# Patient Record
Sex: FEMALE | Race: ASIAN
[De-identification: names, ages, dates, MRNs, and addresses within clinical notes are randomized per-mention and may not be internally consistent; named-entity substitution may affect disease eponyms.]

---

## 2021-11-01 ENCOUNTER — APPOINTMENT (OUTPATIENT)
Dept: HEART AND VASCULAR | Facility: CLINIC | Age: 66
End: 2021-11-01
Payer: COMMERCIAL

## 2021-11-01 ENCOUNTER — NON-APPOINTMENT (OUTPATIENT)
Age: 66
End: 2021-11-01

## 2021-11-01 VITALS
DIASTOLIC BLOOD PRESSURE: 80 MMHG | SYSTOLIC BLOOD PRESSURE: 140 MMHG | RESPIRATION RATE: 12 BRPM | HEART RATE: 95 BPM | WEIGHT: 138 LBS | BODY MASS INDEX: 25.4 KG/M2 | HEIGHT: 62 IN

## 2021-11-01 DIAGNOSIS — Z85.3 PERSONAL HISTORY OF MALIGNANT NEOPLASM OF BREAST: ICD-10-CM

## 2021-11-01 DIAGNOSIS — Z78.9 OTHER SPECIFIED HEALTH STATUS: ICD-10-CM

## 2021-11-01 DIAGNOSIS — Z80.6 FAMILY HISTORY OF LEUKEMIA: ICD-10-CM

## 2021-11-01 DIAGNOSIS — R00.2 PALPITATIONS: ICD-10-CM

## 2021-11-01 DIAGNOSIS — Z00.00 ENCOUNTER FOR GENERAL ADULT MEDICAL EXAMINATION W/OUT ABNORMAL FINDINGS: ICD-10-CM

## 2021-11-01 DIAGNOSIS — F41.9 ANXIETY DISORDER, UNSPECIFIED: ICD-10-CM

## 2021-11-01 PROCEDURE — 93306 TTE W/DOPPLER COMPLETE: CPT

## 2021-11-01 PROCEDURE — ZZZZZ: CPT

## 2021-11-01 PROCEDURE — 99204 OFFICE O/P NEW MOD 45 MIN: CPT

## 2021-11-01 PROCEDURE — 93000 ELECTROCARDIOGRAM COMPLETE: CPT

## 2021-11-01 RX ORDER — ENALAPRIL MALEATE AND HYDROCHLOROTHIAZIDE 10; 25 MG/1; MG/1
10-25 TABLET ORAL DAILY
Qty: 90 | Refills: 3 | Status: DISCONTINUED | COMMUNITY
End: 2021-11-01

## 2021-11-05 ENCOUNTER — NON-APPOINTMENT (OUTPATIENT)
Age: 66
End: 2021-11-05

## 2021-11-05 PROBLEM — Z00.00 ENCOUNTER FOR PREVENTIVE HEALTH EXAMINATION: Status: ACTIVE | Noted: 2021-10-30

## 2021-11-05 NOTE — HISTORY OF PRESENT ILLNESS
[FreeTextEntry1] : Denies Chest Pain, SOB, Dizziness, Leg edema, Orthopnea, PND, Palpitations, Syncope, MUNOZ, Diaphoresis.\par HTN/ Cardiac murmur- Echo ordered to assess LV function/possible valvular heart disease.\par

## 2021-11-05 NOTE — PHYSICAL EXAM
[Well Developed] : well developed [Well Nourished] : well nourished [No Acute Distress] : no acute distress [Normal Conjunctiva] : normal conjunctiva [Normal Venous Pressure] : normal venous pressure [Normal S1, S2] : normal S1, S2 [No Rub] : no rub [No Gallop] : no gallop [Murmur] : murmur [Clear Lung Fields] : clear lung fields [Good Air Entry] : good air entry [No Respiratory Distress] : no respiratory distress  [Soft] : abdomen soft [Non Tender] : non-tender [No Masses/organomegaly] : no masses/organomegaly [Normal Bowel Sounds] : normal bowel sounds [Normal Gait] : normal gait [No Edema] : no edema [No Cyanosis] : no cyanosis [No Clubbing] : no clubbing [No Varicosities] : no varicosities [No Rash] : no rash [No Skin Lesions] : no skin lesions [Moves all extremities] : moves all extremities [No Focal Deficits] : no focal deficits [Normal Speech] : normal speech [Alert and Oriented] : alert and oriented [Normal memory] : normal memory [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

## 2021-11-05 NOTE — DISCUSSION/SUMMARY
[Hyperlipidemia] : hyperlipidemia [None] : There are no changes in medication management [Diet Modification] : diet modification [Hypertension] : hypertension [Stable] : stable [Discontinue] : discontinuing thiazide diuretics [Continue] : continuing ACE inhibitors [Begin] : beginning beta blockers [Low Sodium Diet] : low sodium diet [Patient] : the patient [FreeTextEntry1] : Cardiac murmur- Stable; no further intervention at this time (see echo).\par Blood work reviewed with pt. Maintain a low caloric, low sodium, low cholesterol  diet. Dietary counseling given, diet and exercise discussed in detail.

## 2022-01-17 ENCOUNTER — RX RENEWAL (OUTPATIENT)
Age: 67
End: 2022-01-17

## 2022-02-11 ENCOUNTER — RX CHANGE (OUTPATIENT)
Age: 67
End: 2022-02-11

## 2022-02-25 ENCOUNTER — APPOINTMENT (OUTPATIENT)
Dept: HEART AND VASCULAR | Facility: CLINIC | Age: 67
End: 2022-02-25
Payer: COMMERCIAL

## 2022-02-25 PROCEDURE — 36415 COLL VENOUS BLD VENIPUNCTURE: CPT

## 2022-03-01 ENCOUNTER — NON-APPOINTMENT (OUTPATIENT)
Age: 67
End: 2022-03-01

## 2022-03-01 ENCOUNTER — APPOINTMENT (OUTPATIENT)
Dept: HEART AND VASCULAR | Facility: CLINIC | Age: 67
End: 2022-03-01
Payer: COMMERCIAL

## 2022-03-01 VITALS
SYSTOLIC BLOOD PRESSURE: 120 MMHG | BODY MASS INDEX: 22.82 KG/M2 | HEIGHT: 62 IN | DIASTOLIC BLOOD PRESSURE: 80 MMHG | WEIGHT: 124 LBS

## 2022-03-01 PROCEDURE — 93000 ELECTROCARDIOGRAM COMPLETE: CPT

## 2022-03-01 PROCEDURE — 99214 OFFICE O/P EST MOD 30 MIN: CPT

## 2022-03-01 NOTE — PHYSICAL EXAM

## 2022-03-01 NOTE — HISTORY OF PRESENT ILLNESS
[FreeTextEntry1] : Denies Chest Pain, SOB, Dizziness, Leg edema, Orthopnea, PND, Palpitations, Syncope, MUNOZ, Diaphoresis.

## 2022-03-01 NOTE — END OF VISIT
[FreeTextEntry3] : All medical record entries made by the Scribe were at my, Dr. Rodrick Shaikh, direction and personally dictated by me on 03/01/2022. I have reviewed the chart and agree that the record accurately reflects my personal performance of the history, physical exam, assessment and plan. I have also personally directed, reviewed, and agreed with the chart. [Time Spent: ___ minutes] : I have spent [unfilled] minutes of time on the encounter.

## 2022-03-01 NOTE — DISCUSSION/SUMMARY
[Hyperlipidemia] : hyperlipidemia [Diet Modification] : diet modification [Hypertension] : hypertension [Stable] : stable [None] : There are no changes in medication management [Low Sodium Diet] : low sodium diet [NSAIDs Avoidance] : non-steroidal anti-inflammatory drugs avoidance [Patient] : the patient [de-identified] : Atorvastatin dc'd [FreeTextEntry1] : Cardiac Murmur - Stable; no further intervention at this time (see last echo).\par Blood work reviewed with patient. Maintain a low caloric, low sodium, low cholesterol diet. Dietary counseling given, diet and exercise discussed in detail.

## 2022-03-01 NOTE — ADDENDUM
[FreeTextEntry1] : Documented by London Adams acting as a scribe for Dr. Rodrick Shaikh on 03/01/2022.

## 2022-03-04 LAB
25(OH)D3 SERPL-MCNC: 35.7 NG/ML
ALBUMIN SERPL ELPH-MCNC: 4.7 G/DL
ALP BLD-CCNC: 99 U/L
ALT SERPL-CCNC: 26 U/L
ANION GAP SERPL CALC-SCNC: 16 MMOL/L
AST SERPL-CCNC: 26 U/L
BASOPHILS # BLD AUTO: 0.04 K/UL
BASOPHILS NFR BLD AUTO: 0.7 %
BILIRUB SERPL-MCNC: 0.6 MG/DL
BUN SERPL-MCNC: 14 MG/DL
CALCIUM SERPL-MCNC: 9.8 MG/DL
CHLORIDE SERPL-SCNC: 104 MMOL/L
CHOLEST SERPL-MCNC: 138 MG/DL
CO2 SERPL-SCNC: 23 MMOL/L
COVID-19 SPIKE DOMAIN ANTIBODY INTERPRETATION: POSITIVE
CREAT SERPL-MCNC: 0.79 MG/DL
EOSINOPHIL # BLD AUTO: 0.06 K/UL
EOSINOPHIL NFR BLD AUTO: 1.1 %
ESTIMATED AVERAGE GLUCOSE: 114 MG/DL
FOLATE SERPL-MCNC: 8.8 NG/ML
GLUCOSE SERPL-MCNC: 96 MG/DL
HBA1C MFR BLD HPLC: 5.6 %
HCT VFR BLD CALC: 44 %
HDLC SERPL-MCNC: 55 MG/DL
HGB BLD-MCNC: 14 G/DL
IMM GRANULOCYTES NFR BLD AUTO: 0.2 %
LDLC SERPL CALC-MCNC: 68 MG/DL
LYMPHOCYTES # BLD AUTO: 1.62 K/UL
LYMPHOCYTES NFR BLD AUTO: 28.9 %
MAN DIFF?: NORMAL
MCHC RBC-ENTMCNC: 30.3 PG
MCHC RBC-ENTMCNC: 31.8 GM/DL
MCV RBC AUTO: 95.2 FL
MONOCYTES # BLD AUTO: 0.33 K/UL
MONOCYTES NFR BLD AUTO: 5.9 %
NEUTROPHILS # BLD AUTO: 3.54 K/UL
NEUTROPHILS NFR BLD AUTO: 63.2 %
NONHDLC SERPL-MCNC: 83 MG/DL
PLATELET # BLD AUTO: 268 K/UL
POTASSIUM SERPL-SCNC: 4.7 MMOL/L
PROT SERPL-MCNC: 7 G/DL
RBC # BLD: 4.62 M/UL
RBC # FLD: 13 %
SARS-COV-2 AB SERPL IA-ACNC: >250 U/ML
SODIUM SERPL-SCNC: 143 MMOL/L
T3 SERPL-MCNC: 93 NG/DL
T3FREE SERPL-MCNC: 2.44 PG/ML
T4 FREE SERPL-MCNC: 1.4 NG/DL
T4 SERPL-MCNC: 9 UG/DL
TRIGL SERPL-MCNC: 76 MG/DL
TSH SERPL-ACNC: 1.5 UIU/ML
VIT B12 SERPL-MCNC: 426 PG/ML
WBC # FLD AUTO: 5.6 K/UL

## 2022-08-08 ENCOUNTER — RX RENEWAL (OUTPATIENT)
Age: 67
End: 2022-08-08

## 2022-10-28 ENCOUNTER — APPOINTMENT (OUTPATIENT)
Dept: HEART AND VASCULAR | Facility: CLINIC | Age: 67
End: 2022-10-28

## 2022-10-28 PROCEDURE — 36415 COLL VENOUS BLD VENIPUNCTURE: CPT

## 2022-10-31 LAB
25(OH)D3 SERPL-MCNC: 53.7 NG/ML
ALBUMIN SERPL ELPH-MCNC: 4.4 G/DL
ALP BLD-CCNC: 93 U/L
ALT SERPL-CCNC: 18 U/L
ANION GAP SERPL CALC-SCNC: 11 MMOL/L
AST SERPL-CCNC: 20 U/L
BASOPHILS # BLD AUTO: 0.03 K/UL
BASOPHILS NFR BLD AUTO: 0.6 %
BILIRUB SERPL-MCNC: 0.4 MG/DL
BUN SERPL-MCNC: 16 MG/DL
CALCIUM SERPL-MCNC: 9.4 MG/DL
CHLORIDE SERPL-SCNC: 102 MMOL/L
CHOLEST SERPL-MCNC: 196 MG/DL
CO2 SERPL-SCNC: 24 MMOL/L
COVID-19 SPIKE DOMAIN ANTIBODY INTERPRETATION: POSITIVE
CREAT SERPL-MCNC: 0.64 MG/DL
EGFR: 99 ML/MIN/1.73M2
EOSINOPHIL # BLD AUTO: 0.06 K/UL
EOSINOPHIL NFR BLD AUTO: 1.3 %
ESTIMATED AVERAGE GLUCOSE: 114 MG/DL
FOLATE SERPL-MCNC: 12.4 NG/ML
GLUCOSE SERPL-MCNC: 102 MG/DL
HBA1C MFR BLD HPLC: 5.6 %
HCT VFR BLD CALC: 39.8 %
HDLC SERPL-MCNC: 71 MG/DL
HGB BLD-MCNC: 13 G/DL
IMM GRANULOCYTES NFR BLD AUTO: 0.2 %
LDLC SERPL CALC-MCNC: 109 MG/DL
LYMPHOCYTES # BLD AUTO: 1.76 K/UL
LYMPHOCYTES NFR BLD AUTO: 37.2 %
MAN DIFF?: NORMAL
MCHC RBC-ENTMCNC: 31.3 PG
MCHC RBC-ENTMCNC: 32.7 GM/DL
MCV RBC AUTO: 95.7 FL
MONOCYTES # BLD AUTO: 0.34 K/UL
MONOCYTES NFR BLD AUTO: 7.2 %
NEUTROPHILS # BLD AUTO: 2.53 K/UL
NEUTROPHILS NFR BLD AUTO: 53.5 %
NONHDLC SERPL-MCNC: 125 MG/DL
PLATELET # BLD AUTO: 257 K/UL
POTASSIUM SERPL-SCNC: 4.2 MMOL/L
PROT SERPL-MCNC: 7 G/DL
RBC # BLD: 4.16 M/UL
RBC # FLD: 12.9 %
SARS-COV-2 AB SERPL IA-ACNC: >250 U/ML
SODIUM SERPL-SCNC: 137 MMOL/L
T3 SERPL-MCNC: 93 NG/DL
T3FREE SERPL-MCNC: 2.54 PG/ML
T4 FREE SERPL-MCNC: 1.2 NG/DL
T4 SERPL-MCNC: 7.7 UG/DL
TRIGL SERPL-MCNC: 80 MG/DL
TSH SERPL-ACNC: 1.71 UIU/ML
VIT B12 SERPL-MCNC: 328 PG/ML
WBC # FLD AUTO: 4.73 K/UL

## 2022-11-02 ENCOUNTER — APPOINTMENT (OUTPATIENT)
Dept: HEART AND VASCULAR | Facility: CLINIC | Age: 67
End: 2022-11-02

## 2022-11-02 ENCOUNTER — NON-APPOINTMENT (OUTPATIENT)
Age: 67
End: 2022-11-02

## 2022-11-02 VITALS
WEIGHT: 118 LBS | DIASTOLIC BLOOD PRESSURE: 80 MMHG | HEIGHT: 62 IN | RESPIRATION RATE: 12 BRPM | HEART RATE: 68 BPM | SYSTOLIC BLOOD PRESSURE: 140 MMHG | BODY MASS INDEX: 21.71 KG/M2

## 2022-11-02 PROCEDURE — 99214 OFFICE O/P EST MOD 30 MIN: CPT | Mod: 25

## 2022-11-02 PROCEDURE — ZZZZZ: CPT

## 2022-11-02 PROCEDURE — 93000 ELECTROCARDIOGRAM COMPLETE: CPT

## 2022-11-02 PROCEDURE — 93306 TTE W/DOPPLER COMPLETE: CPT

## 2022-11-02 NOTE — HISTORY OF PRESENT ILLNESS
[FreeTextEntry1] : Denies Chest Pain, SOB, Dizziness, Leg edema, Orthopnea, PND, Palpitations, Syncope, MUNOZ, Diaphoresis.\par HTN/ Cardiac Murmur- Echo ordered to assess LV function/possible valvular heart disease.

## 2022-11-02 NOTE — DISCUSSION/SUMMARY
[Hyperlipidemia] : hyperlipidemia [Stable] : stable [None] : There are no changes in medication management [Diet Modification] : diet modification [Hypertension] : hypertension [Low Sodium Diet] : low sodium diet [NSAIDs Avoidance] : non-steroidal anti-inflammatory drugs avoidance [Patient] : the patient [Medication Changes Per Orders] : Medication changes are as documented in orders [Begin] : beginning angiotensin receptor blockers [FreeTextEntry1] : Cardiac Murmur- Stable; no further intervention at this time (see echo). \par Blood work reviewed with patient. Maintain a low caloric, low sodium, low cholesterol diet. Dietary counseling given, diet and exercise discussed in detail.

## 2022-11-02 NOTE — END OF VISIT
[FreeTextEntry3] : All medical record entries made by the Scribe were at my, Dr. Rodrick Shaikh, direction and personally dictated by me on 11/02/2022. I have reviewed the chart and agree that the record accurately reflects my personal performance of the history, physical exam, assessment and plan. I have also personally directed, reviewed, and agreed with the chart. [Time Spent: ___ minutes] : I have spent [unfilled] minutes of time on the encounter.

## 2022-11-02 NOTE — ADDENDUM
[FreeTextEntry1] : Documented by Dejah Telles acting as a scribe for Dr. Rodrick Shaikh on 11/02/2022

## 2022-11-02 NOTE — PHYSICAL EXAM
[Well Developed] : well developed [Well Nourished] : well nourished [No Acute Distress] : no acute distress [Normal Conjunctiva] : normal conjunctiva [Normal Venous Pressure] : normal venous pressure [No Carotid Bruit] : no carotid bruit [Normal] : normal S1, S2, no murmur, no rub, no gallop [Normal S1, S2] : normal S1, S2 [No Rub] : no rub [No Gallop] : no gallop [Murmur] : murmur [Clear Lung Fields] : clear lung fields [Good Air Entry] : good air entry [No Respiratory Distress] : no respiratory distress  [Soft] : abdomen soft [Non Tender] : non-tender [No Masses/organomegaly] : no masses/organomegaly [Normal Bowel Sounds] : normal bowel sounds [Normal Gait] : normal gait [No Edema] : no edema [No Cyanosis] : no cyanosis [No Clubbing] : no clubbing [No Varicosities] : no varicosities [No Rash] : no rash [No Skin Lesions] : no skin lesions [Moves all extremities] : moves all extremities [No Focal Deficits] : no focal deficits [Normal Speech] : normal speech [Alert and Oriented] : alert and oriented [Normal memory] : normal memory [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

## 2023-05-31 ENCOUNTER — APPOINTMENT (OUTPATIENT)
Dept: HEART AND VASCULAR | Facility: CLINIC | Age: 68
End: 2023-05-31

## 2023-06-20 ENCOUNTER — APPOINTMENT (OUTPATIENT)
Dept: HEART AND VASCULAR | Facility: CLINIC | Age: 68
End: 2023-06-20
Payer: MEDICARE

## 2023-06-20 PROCEDURE — 36415 COLL VENOUS BLD VENIPUNCTURE: CPT

## 2023-06-21 LAB
25(OH)D3 SERPL-MCNC: 28.3 NG/ML
ALBUMIN SERPL ELPH-MCNC: 4.4 G/DL
ALP BLD-CCNC: 89 U/L
ALT SERPL-CCNC: 14 U/L
ANION GAP SERPL CALC-SCNC: 12 MMOL/L
AST SERPL-CCNC: 18 U/L
BILIRUB SERPL-MCNC: 0.4 MG/DL
BUN SERPL-MCNC: 11 MG/DL
CALCIUM SERPL-MCNC: 9.4 MG/DL
CHLORIDE SERPL-SCNC: 104 MMOL/L
CHOLEST SERPL-MCNC: 206 MG/DL
CO2 SERPL-SCNC: 23 MMOL/L
COVID-19 SPIKE DOMAIN ANTIBODY INTERPRETATION: POSITIVE
CREAT SERPL-MCNC: 0.77 MG/DL
EGFR: 86 ML/MIN/1.73M2
ESTIMATED AVERAGE GLUCOSE: 117 MG/DL
FOLATE SERPL-MCNC: 14 NG/ML
GLUCOSE SERPL-MCNC: 102 MG/DL
HBA1C MFR BLD HPLC: 5.7 %
HDLC SERPL-MCNC: 64 MG/DL
LDLC SERPL CALC-MCNC: 117 MG/DL
NONHDLC SERPL-MCNC: 142 MG/DL
POTASSIUM SERPL-SCNC: 4 MMOL/L
PROT SERPL-MCNC: 6.9 G/DL
SARS-COV-2 AB SERPL IA-ACNC: >250 U/ML
SODIUM SERPL-SCNC: 140 MMOL/L
T3 SERPL-MCNC: 101 NG/DL
T3FREE SERPL-MCNC: 2.58 PG/ML
T4 FREE SERPL-MCNC: 1.1 NG/DL
T4 SERPL-MCNC: 7.2 UG/DL
TRIGL SERPL-MCNC: 123 MG/DL
TSH SERPL-ACNC: 1.57 UIU/ML
VIT B12 SERPL-MCNC: 365 PG/ML

## 2023-06-26 ENCOUNTER — NON-APPOINTMENT (OUTPATIENT)
Age: 68
End: 2023-06-26

## 2023-06-26 ENCOUNTER — APPOINTMENT (OUTPATIENT)
Dept: HEART AND VASCULAR | Facility: CLINIC | Age: 68
End: 2023-06-26
Payer: MEDICARE

## 2023-06-26 VITALS
SYSTOLIC BLOOD PRESSURE: 130 MMHG | HEIGHT: 62 IN | BODY MASS INDEX: 22.63 KG/M2 | WEIGHT: 123 LBS | HEART RATE: 64 BPM | DIASTOLIC BLOOD PRESSURE: 80 MMHG

## 2023-06-26 PROCEDURE — 93000 ELECTROCARDIOGRAM COMPLETE: CPT

## 2023-06-26 PROCEDURE — 99214 OFFICE O/P EST MOD 30 MIN: CPT | Mod: 25

## 2023-06-26 NOTE — DISCUSSION/SUMMARY
[Hyperlipidemia] : hyperlipidemia [Diet Modification] : diet modification [Hypertension] : hypertension [Stable] : stable [None] : There are no changes in medication management [Low Sodium Diet] : low sodium diet [NSAIDs Avoidance] : non-steroidal anti-inflammatory drugs avoidance [Patient] : the patient [FreeTextEntry1] : Cardiac Murmur- Stable; no further intervention at this time (see echo). \par Blood work reviewed with patient. Maintain a low caloric, low sodium, low cholesterol diet. Dietary counseling given, diet and exercise discussed in detail.

## 2023-06-26 NOTE — PHYSICAL EXAM
[Well Developed] : well developed [Well Nourished] : well nourished [No Acute Distress] : no acute distress [Normal Conjunctiva] : normal conjunctiva [Normal Venous Pressure] : normal venous pressure [No Carotid Bruit] : no carotid bruit [Normal S1, S2] : normal S1, S2 [No Rub] : no rub [No Gallop] : no gallop [Murmur] : murmur [Clear Lung Fields] : clear lung fields [Good Air Entry] : good air entry [No Respiratory Distress] : no respiratory distress  [Soft] : abdomen soft [Non Tender] : non-tender [No Masses/organomegaly] : no masses/organomegaly [Normal Bowel Sounds] : normal bowel sounds [Normal Gait] : normal gait [No Edema] : no edema [No Cyanosis] : no cyanosis [No Varicosities] : no varicosities [No Clubbing] : no clubbing [No Rash] : no rash [No Skin Lesions] : no skin lesions [Moves all extremities] : moves all extremities [No Focal Deficits] : no focal deficits [Normal Speech] : normal speech [Alert and Oriented] : alert and oriented [Normal memory] : normal memory [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

## 2023-06-26 NOTE — END OF VISIT
[FreeTextEntry3] : All medical record entries made by the Scribe were at my, Dr. Rodrick Shaikh, direction and personally dictated by me on 06/26/2023. I have reviewed the chart and agree that the record accurately reflects my personal performance of the history, physical exam, assessment and plan. I have also personally directed, reviewed, and agreed with the chart. [Time Spent: ___ minutes] : I have spent [unfilled] minutes of time on the encounter.

## 2023-11-25 ENCOUNTER — RX RENEWAL (OUTPATIENT)
Age: 68
End: 2023-11-25

## 2023-12-12 ENCOUNTER — APPOINTMENT (OUTPATIENT)
Dept: HEART AND VASCULAR | Facility: CLINIC | Age: 68
End: 2023-12-12
Payer: COMMERCIAL

## 2023-12-12 PROCEDURE — 36415 COLL VENOUS BLD VENIPUNCTURE: CPT

## 2023-12-13 LAB
25(OH)D3 SERPL-MCNC: 50.5 NG/ML
ALBUMIN SERPL ELPH-MCNC: 4.8 G/DL
ALP BLD-CCNC: 89 U/L
ALT SERPL-CCNC: 14 U/L
ANION GAP SERPL CALC-SCNC: 13 MMOL/L
AST SERPL-CCNC: 17 U/L
BASOPHILS # BLD AUTO: 0.04 K/UL
BASOPHILS NFR BLD AUTO: 0.9 %
BILIRUB SERPL-MCNC: 0.4 MG/DL
BUN SERPL-MCNC: 18 MG/DL
CALCIUM SERPL-MCNC: 9.6 MG/DL
CHLORIDE SERPL-SCNC: 104 MMOL/L
CHOLEST SERPL-MCNC: 218 MG/DL
CO2 SERPL-SCNC: 24 MMOL/L
CREAT SERPL-MCNC: 0.77 MG/DL
EGFR: 86 ML/MIN/1.73M2
EOSINOPHIL # BLD AUTO: 0.07 K/UL
EOSINOPHIL NFR BLD AUTO: 1.6 %
ESTIMATED AVERAGE GLUCOSE: 111 MG/DL
FOLATE SERPL-MCNC: 11.2 NG/ML
GLUCOSE SERPL-MCNC: 114 MG/DL
HBA1C MFR BLD HPLC: 5.5 %
HCT VFR BLD CALC: 42 %
HDLC SERPL-MCNC: 80 MG/DL
HGB BLD-MCNC: 13.5 G/DL
IMM GRANULOCYTES NFR BLD AUTO: 0.2 %
LDLC SERPL CALC-MCNC: 122 MG/DL
LYMPHOCYTES # BLD AUTO: 1.86 K/UL
LYMPHOCYTES NFR BLD AUTO: 42.1 %
MAN DIFF?: NORMAL
MCHC RBC-ENTMCNC: 30.9 PG
MCHC RBC-ENTMCNC: 32.1 GM/DL
MCV RBC AUTO: 96.1 FL
MONOCYTES # BLD AUTO: 0.38 K/UL
MONOCYTES NFR BLD AUTO: 8.6 %
NEUTROPHILS # BLD AUTO: 2.06 K/UL
NEUTROPHILS NFR BLD AUTO: 46.6 %
NONHDLC SERPL-MCNC: 138 MG/DL
PLATELET # BLD AUTO: 245 K/UL
POTASSIUM SERPL-SCNC: 4.5 MMOL/L
PROT SERPL-MCNC: 7.4 G/DL
RBC # BLD: 4.37 M/UL
RBC # FLD: 12.6 %
SODIUM SERPL-SCNC: 141 MMOL/L
T3 SERPL-MCNC: 88 NG/DL
T3FREE SERPL-MCNC: 2.71 PG/ML
T4 FREE SERPL-MCNC: 1.3 NG/DL
T4 SERPL-MCNC: 7.7 UG/DL
TRIGL SERPL-MCNC: 89 MG/DL
TSH SERPL-ACNC: 1.85 UIU/ML
VIT B12 SERPL-MCNC: 517 PG/ML
WBC # FLD AUTO: 4.42 K/UL

## 2023-12-18 ENCOUNTER — APPOINTMENT (OUTPATIENT)
Dept: HEART AND VASCULAR | Facility: CLINIC | Age: 68
End: 2023-12-18
Payer: COMMERCIAL

## 2023-12-18 ENCOUNTER — APPOINTMENT (OUTPATIENT)
Dept: HEART AND VASCULAR | Facility: CLINIC | Age: 68
End: 2023-12-18
Payer: MEDICARE

## 2023-12-18 ENCOUNTER — NON-APPOINTMENT (OUTPATIENT)
Age: 68
End: 2023-12-18

## 2023-12-18 VITALS
HEIGHT: 62 IN | HEART RATE: 65 BPM | DIASTOLIC BLOOD PRESSURE: 80 MMHG | SYSTOLIC BLOOD PRESSURE: 130 MMHG | BODY MASS INDEX: 22.08 KG/M2 | WEIGHT: 120 LBS

## 2023-12-18 DIAGNOSIS — R09.89 OTHER SPECIFIED SYMPTOMS AND SIGNS INVOLVING THE CIRCULATORY AND RESPIRATORY SYSTEMS: ICD-10-CM

## 2023-12-18 PROCEDURE — 93880 EXTRACRANIAL BILAT STUDY: CPT

## 2023-12-18 PROCEDURE — 93000 ELECTROCARDIOGRAM COMPLETE: CPT

## 2023-12-18 PROCEDURE — ZZZZZ: CPT

## 2023-12-18 PROCEDURE — 99214 OFFICE O/P EST MOD 30 MIN: CPT | Mod: 25

## 2023-12-18 PROCEDURE — 93306 TTE W/DOPPLER COMPLETE: CPT

## 2023-12-18 RX ORDER — ENALAPRIL MALEATE 10 MG/1
10 TABLET ORAL
Qty: 90 | Refills: 3 | Status: ACTIVE | COMMUNITY
Start: 2021-11-01 | End: 1900-01-01

## 2023-12-18 RX ORDER — METOPROLOL SUCCINATE 25 MG/1
25 TABLET, EXTENDED RELEASE ORAL
Qty: 90 | Refills: 3 | Status: ACTIVE | COMMUNITY
Start: 2021-11-01 | End: 1900-01-01

## 2023-12-18 NOTE — HISTORY OF PRESENT ILLNESS
[FreeTextEntry1] : Denies Chest Pain, SOB, Dizziness, Leg edema, Orthopnea, PND, Palpitations, Syncope, MUNOZ, Diaphoresis. HTN / Cardiac murmur - Echo ordered to assess LV function/possible valvular heart disease.

## 2023-12-18 NOTE — PHYSICAL EXAM
[Well Developed] : well developed [Well Nourished] : well nourished [No Acute Distress] : no acute distress [Normal Conjunctiva] : normal conjunctiva [Normal Venous Pressure] : normal venous pressure [Normal S1, S2] : normal S1, S2 [No Rub] : no rub [No Gallop] : no gallop [Murmur] : murmur [Clear Lung Fields] : clear lung fields [Good Air Entry] : good air entry [No Respiratory Distress] : no respiratory distress  [Soft] : abdomen soft [Non Tender] : non-tender [No Masses/organomegaly] : no masses/organomegaly [Normal Bowel Sounds] : normal bowel sounds [Normal Gait] : normal gait [No Edema] : no edema [No Cyanosis] : no cyanosis [No Clubbing] : no clubbing [No Varicosities] : no varicosities [No Rash] : no rash [No Skin Lesions] : no skin lesions [Moves all extremities] : moves all extremities [No Focal Deficits] : no focal deficits [Normal Speech] : normal speech [Alert and Oriented] : alert and oriented [Normal memory] : normal memory [Carotid Bruit] : carotid bruit [de-identified] : L [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

## 2023-12-18 NOTE — END OF VISIT
[FreeTextEntry3] : All medical record entries made by the Scribe were at my, Dr. Rodrick Shaikh, direction and personally dictated by me on -12/18/2023 - I have reviewed the chart and agree that the record accurately reflects my personal performance of the history, physical exam, assessment and plan. I have also personally directed, reviewed and agreed with the chart. [Time Spent: ___ minutes] : I have spent [unfilled] minutes of time on the encounter.

## 2023-12-18 NOTE — DISCUSSION/SUMMARY
[Hyperlipidemia] : hyperlipidemia [Diet Modification] : diet modification [Hypertension] : hypertension [Stable] : stable [None] : There are no changes in medication management [Low Sodium Diet] : low sodium diet [NSAIDs Avoidance] : non-steroidal anti-inflammatory drugs avoidance [Patient] : the patient [FreeTextEntry1] : Cardiac murmur - Stable; no further intervention at this time (see echo). Bruit / Carotid artery disease - Stable; no further intervention at this time. Blood work reviewed with patient. Maintain a low caloric, low sodium, low cholesterol diet, Dietary counseling given, diet and exercise discussed in detail.

## 2024-06-17 ENCOUNTER — LABORATORY RESULT (OUTPATIENT)
Age: 69
End: 2024-06-17

## 2024-06-18 ENCOUNTER — APPOINTMENT (OUTPATIENT)
Dept: HEART AND VASCULAR | Facility: CLINIC | Age: 69
End: 2024-06-18
Payer: MEDICARE

## 2024-06-18 PROCEDURE — 36415 COLL VENOUS BLD VENIPUNCTURE: CPT

## 2024-06-19 LAB
25(OH)D3 SERPL-MCNC: 36.4 NG/ML
ALBUMIN SERPL ELPH-MCNC: 4.3 G/DL
ALP BLD-CCNC: 85 U/L
ALT SERPL-CCNC: 15 U/L
ANION GAP SERPL CALC-SCNC: 13 MMOL/L
AST SERPL-CCNC: 17 U/L
BASOPHILS # BLD AUTO: 0.05 K/UL
BASOPHILS NFR BLD AUTO: 1.3 %
BILIRUB SERPL-MCNC: 0.4 MG/DL
BUN SERPL-MCNC: 9 MG/DL
CALCIUM SERPL-MCNC: 9.3 MG/DL
CHLORIDE SERPL-SCNC: 105 MMOL/L
CHOLEST SERPL-MCNC: 214 MG/DL
CO2 SERPL-SCNC: 22 MMOL/L
CREAT SERPL-MCNC: 0.76 MG/DL
EGFR: 86 ML/MIN/1.73M2
EOSINOPHIL # BLD AUTO: 0.09 K/UL
EOSINOPHIL NFR BLD AUTO: 2.3 %
ESTIMATED AVERAGE GLUCOSE: 111 MG/DL
FOLATE SERPL-MCNC: 11.5 NG/ML
GLUCOSE SERPL-MCNC: 96 MG/DL
HBA1C MFR BLD HPLC: 5.5 %
HCT VFR BLD CALC: 38.1 %
HDLC SERPL-MCNC: 72 MG/DL
HGB BLD-MCNC: 12.5 G/DL
IMM GRANULOCYTES NFR BLD AUTO: 0 %
LDLC SERPL CALC-MCNC: 128 MG/DL
LYMPHOCYTES # BLD AUTO: 1.51 K/UL
LYMPHOCYTES NFR BLD AUTO: 39 %
MAN DIFF?: NORMAL
MCHC RBC-ENTMCNC: 31.6 PG
MCHC RBC-ENTMCNC: 32.8 GM/DL
MCV RBC AUTO: 96.2 FL
MONOCYTES # BLD AUTO: 0.27 K/UL
MONOCYTES NFR BLD AUTO: 7 %
NEUTROPHILS # BLD AUTO: 1.95 K/UL
NEUTROPHILS NFR BLD AUTO: 50.4 %
NONHDLC SERPL-MCNC: 142 MG/DL
PLATELET # BLD AUTO: 251 K/UL
POTASSIUM SERPL-SCNC: 4.1 MMOL/L
PROT SERPL-MCNC: 6.7 G/DL
RBC # BLD: 3.96 M/UL
RBC # FLD: 13.2 %
SODIUM SERPL-SCNC: 140 MMOL/L
T3 SERPL-MCNC: 90 NG/DL
T3FREE SERPL-MCNC: 2.27 PG/ML
T3RU NFR SERPL: 1 TBI
T4 FREE SERPL-MCNC: 1.1 NG/DL
T4 SERPL-MCNC: 6.6 UG/DL
TRIGL SERPL-MCNC: 79 MG/DL
TSH SERPL-ACNC: 2.49 UIU/ML
VIT B12 SERPL-MCNC: 380 PG/ML
WBC # FLD AUTO: 3.87 K/UL

## 2024-06-24 ENCOUNTER — NON-APPOINTMENT (OUTPATIENT)
Age: 69
End: 2024-06-24

## 2024-06-24 ENCOUNTER — APPOINTMENT (OUTPATIENT)
Dept: HEART AND VASCULAR | Facility: CLINIC | Age: 69
End: 2024-06-24
Payer: COMMERCIAL

## 2024-06-24 VITALS
SYSTOLIC BLOOD PRESSURE: 150 MMHG | HEIGHT: 62 IN | WEIGHT: 125 LBS | DIASTOLIC BLOOD PRESSURE: 90 MMHG | HEART RATE: 60 BPM | BODY MASS INDEX: 23 KG/M2

## 2024-06-24 DIAGNOSIS — I65.22 OCCLUSION AND STENOSIS OF LEFT CAROTID ARTERY: ICD-10-CM

## 2024-06-24 DIAGNOSIS — E78.5 HYPERLIPIDEMIA, UNSPECIFIED: ICD-10-CM

## 2024-06-24 DIAGNOSIS — R01.1 CARDIAC MURMUR, UNSPECIFIED: ICD-10-CM

## 2024-06-24 DIAGNOSIS — I10 ESSENTIAL (PRIMARY) HYPERTENSION: ICD-10-CM

## 2024-06-24 PROCEDURE — 93000 ELECTROCARDIOGRAM COMPLETE: CPT

## 2024-06-24 PROCEDURE — G2211 COMPLEX E/M VISIT ADD ON: CPT | Mod: NC

## 2024-06-24 PROCEDURE — 99214 OFFICE O/P EST MOD 30 MIN: CPT | Mod: 25
